# Patient Record
Sex: FEMALE | Race: WHITE
[De-identification: names, ages, dates, MRNs, and addresses within clinical notes are randomized per-mention and may not be internally consistent; named-entity substitution may affect disease eponyms.]

---

## 2017-09-14 ENCOUNTER — HOSPITAL ENCOUNTER (EMERGENCY)
Dept: HOSPITAL 62 - ER | Age: 22
LOS: 1 days | Discharge: HOME | End: 2017-09-15
Payer: SELF-PAY

## 2017-09-14 DIAGNOSIS — H93.8X2: Primary | ICD-10-CM

## 2017-09-14 PROCEDURE — 70250 X-RAY EXAM OF SKULL: CPT

## 2017-09-14 PROCEDURE — 99283 EMERGENCY DEPT VISIT LOW MDM: CPT

## 2017-09-15 VITALS — SYSTOLIC BLOOD PRESSURE: 132 MMHG | DIASTOLIC BLOOD PRESSURE: 60 MMHG

## 2017-09-15 NOTE — ER DOCUMENT REPORT
ED Foreign Body





- General


Chief Complaint: Foreign Body


Stated Complaint: POSSIBLE FOREIGN OBJECT IN EAR


Time Seen by Provider: 09/14/17 23:19


Mode of Arrival: Ambulatory


Information source: Patient


Notes: 





Patient is a 22-year-old female who presents to the ER today for possible 

foreign body stuck in her left earlobe.  Patient states that she lost the 

backing of her left earring in the bed the other night and feels a lump in her 

left earlobe that she has never felt before.  She denies any redness, swelling, 

drainage.  She cannot see the  back of the earring.


TRAVEL OUTSIDE OF THE [place] IN LAST 30 DAYS: No





Past Medical History





- General


Information source: Patient





- Social History


Smoking Status: Unknown if Ever Smoked


Cigarette use (# per day): No


Chew tobacco use (# tins/day): No


Frequency of alcohol use: None


Drug Abuse: None


Family History: Reviewed & Not Pertinent


Patient has suicidal ideation: No


Patient has homicidal ideation: No


Renal/ Medical History: Denies: Hx Peritoneal Dialysis





Review of Systems





- Review of Systems


Constitutional: No symptoms reported


EENT: See HPI


Cardiovascular: No symptoms reported


Respiratory: No symptoms reported


Gastrointestinal: No symptoms reported


Genitourinary: No symptoms reported


Female Genitourinary: No symptoms reported


Musculoskeletal: No symptoms reported


Skin: No symptoms reported


Hematologic/Lymphatic: No symptoms reported


Neurological/Psychological: No symptoms reported





Physical Exam





- Vital signs


Vitals: 


 











Temp Pulse Resp BP Pulse Ox


 


 98.4 F   75   16   133/75 H  99 


 


 09/14/17 20:57  09/14/17 20:57  09/14/17 20:57  09/14/17 20:57  09/14/17 20:57














- Notes


Notes: 





PHYSICAL EXAMINATION: 


GENERAL: Well-appearing and in no acute distress. 


HEAD: Atraumatic, normocephalic. 


EYES: Pupils equal round and reactive to light, extraocular movements intact, 

sclera anicteric, conjunctiva are normal. 


ENT: ear canals without erythema or foreign body, TMs pearly grey with good 

bony landmarks, nares patent, oropharynx clear without exudates. Moist mucous 

membranes.  Both earlobes have equal amount of scarring underneath ear piercing


NECK: Normal range of motion, supple without lymphadenopathy 


LUNGS: CTAB and equal. No wheezes rales or rhonchi. 


HEART: Regular rate and rhythm without murmurs


EXTREMITIES: Normal range of motion, no pitting edema. No cyanosis. 


NEUROLOGICAL: Cranial nerves grossly intact. Normal sensory/motor exams. 


PSYCH: Normal mood, normal affect. 


SKIN: Warm, Dry, normal turgor, no rashes or lesions noted 

















Course





- Re-evaluation


Re-evalutation: 





09/15/17 00:29


I tried to advise patient that the lump she feels in the left earlobe feels 

exactly like the right earlobe and is very likely just scarring from her ear 

piercing.  She is adamant that this has never been there before and that she is 

concerned that they are backing is stuck in her earlobe.  She pushes for an x-

ray to be performed to look for the backing.  X-rays negative for any foreign 

body in her earlobe.





- Vital Signs


Vital signs: 


 











Temp Pulse Resp BP Pulse Ox


 


 98.3 F   76   14   132/60 H  97 


 


 09/15/17 00:36  09/15/17 00:36  09/15/17 00:36  09/15/17 00:36  09/15/17 00:36














Discharge





- Discharge


Clinical Impression: 


Ear lump


Qualifiers:


 Laterality: left Qualified Code(s): H93.8X2 - Other specified disorders of 

left ear





Condition: Stable


Disposition: HOME, SELF-CARE


Additional Instructions: 


Return immediately for any new or worsening symptoms.





Follow up with primary care provider, call tomorrow to make followup 

appointment.

## 2017-09-15 NOTE — RADIOLOGY REPORT (SQ)
EXAM DESCRIPTION:  SKULL 1-3 VIEWS



COMPLETED DATE/TIME:  9/14/2017 11:57 pm



REASON FOR STUDY:  foreign body in earlobe? left



COMPARISON:  None.



NUMBER OF VIEWS:  Three view.



TECHNIQUE:  Images of the facial bones acquired.



LIMITATIONS:  None.



FINDINGS:  ORBITS: No fracture. No foreign body.

SINUSES: No mucosal thickening. No air fluid levels.

FACIAL BONES: No fracture.

OTHER: Left ear lobe appears radiographically unremarkable; no radiopaque foreign body, as queried.



IMPRESSION:  NO FOREIGN BODY OR FRACTURE OF THE FACIAL BONES.



TECHNICAL DOCUMENTATION:  JOB ID:  [id number]

 2011 [employer]- All Rights Reserved



FLUOROSCOPY TIME:  Not applicable.

## 2018-08-03 ENCOUNTER — HOSPITAL ENCOUNTER (EMERGENCY)
Dept: HOSPITAL 62 - ER | Age: 23
LOS: 1 days | Discharge: HOME | End: 2018-08-04
Payer: SELF-PAY

## 2018-08-03 DIAGNOSIS — R59.1: ICD-10-CM

## 2018-08-03 DIAGNOSIS — J03.90: Primary | ICD-10-CM

## 2018-08-03 DIAGNOSIS — Z88.3: ICD-10-CM

## 2018-08-03 PROCEDURE — 87077 CULTURE AEROBIC IDENTIFY: CPT

## 2018-08-03 PROCEDURE — 70491 CT SOFT TISSUE NECK W/DYE: CPT

## 2018-08-03 PROCEDURE — 87880 STREP A ASSAY W/OPTIC: CPT

## 2018-08-03 PROCEDURE — 96374 THER/PROPH/DIAG INJ IV PUSH: CPT

## 2018-08-03 PROCEDURE — 96372 THER/PROPH/DIAG INJ SC/IM: CPT

## 2018-08-03 PROCEDURE — 99284 EMERGENCY DEPT VISIT MOD MDM: CPT

## 2018-08-03 PROCEDURE — 96375 TX/PRO/DX INJ NEW DRUG ADDON: CPT

## 2018-08-03 PROCEDURE — 87070 CULTURE OTHR SPECIMN AEROBIC: CPT

## 2018-08-04 VITALS — SYSTOLIC BLOOD PRESSURE: 104 MMHG | DIASTOLIC BLOOD PRESSURE: 49 MMHG

## 2018-08-04 NOTE — RADIOLOGY REPORT (SQ)
EXAM DESCRIPTION: 



CT NECK CHEST WITH IV CONTRAST



COMPLETED DATE/TME:  08/04/2018 01:04



CLINICAL HISTORY: 



23 years, Female, possible jose-tonsillar abscess



COMPARISON:

None.



TECHNIQUE:

Axial CT images of the neck were obtained after the

administration of IV contrast.   Images stored on PACS.

 

All CT scanners at this facility use dose modulation, iterative

reconstruction, and/or weight based dosing when appropriate to

reduce radiation dose to as low as reasonably achievable (ALARA).





[facility]: Dose Right [facility]: CareDose   [facility]: Dose Right    [facility]:

Teradose 4D    [facility]: Smart Technologies



LIMITATIONS:

None.



FINDINGS:



Glands:

Thyroid: Unremarkable.

Submandibular: Unremarkable.

Parotid: Unremarkable.



Underwood tonsils: The tonsils have a striated appearance. No

evidence of an abscess. There are cervical lymph nodes measuring

up to 1.4 cm in size along the level two



Parapharyngeal fat:  No displacement.



Epiglottis: Unremarkable.



 space: Unremarkable.



Prevertebral space: No edema.



Vascular structures: Unremarkable.



Bones: Unremarkable.

 





IMPRESSION:



Acute tonsillitis with reactive cervical lymphadenopathy. No

evidence of a peritonsillar abscess.

 

TECHNICAL DOCUMENTATION:



Quality ID # [id number]: Final reports with documentation of one or more

dose reduction techniques (e.g., Automated exposure control,

adjustment of the mA and/or kV according to patient size, use of

iterative reconstruction technique)



 2011 [email or web address]- All Rights Reserved

## 2018-08-04 NOTE — ER DOCUMENT REPORT
ED ENT





- General


Chief Complaint: Sore Throat


Stated Complaint: THROAT PAIN


Time Seen by Provider: 08/04/18 00:52


Mode of Arrival: Ambulatory


Notes: 











Patient is an otherwise healthy 23-year-old female who presents with chief 

complaint of sore throat and pain when swallowing.  Patient reports that the 

pain is to the right side of her throat, reports that has been there for 

approximately 4 days.  She reports that she has severe pain with any swallowing 

including liquids.  Patient unsure if she has had a fever.  Patient is speaking 

in complete sentences and is able to swallow her saliva.


TRAVEL OUTSIDE OF THE [place] IN LAST 30 DAYS: No





- Related Data


Allergies/Adverse Reactions: 


 





azithromycin Allergy (Verified 08/04/18 01:29)


 











Past Medical History





- General


Information source: Patient





- Social History


Smoking Status: Never Smoker


Drug Abuse: None


Lives with: Family


Family History: Reviewed & Not Pertinent


Patient has suicidal ideation: No


Patient has homicidal ideation: No





- Medical History


Medical History: Negative


Renal/ Medical History: Denies: Hx Peritoneal Dialysis


Surgical Hx: Negative





- Immunizations


Immunizations up to date: Yes


Hx Diphtheria, Pertussis, Tetanus Vaccination: Yes





Review of Systems





- Review of Systems


Constitutional: See HPI


EENT: See HPI


Cardiovascular: No symptoms reported


Respiratory: No symptoms reported


Gastrointestinal: No symptoms reported


Genitourinary: No symptoms reported


Female Genitourinary: No symptoms reported


Musculoskeletal: No symptoms reported


Skin: No symptoms reported


Hematologic/Lymphatic: No symptoms reported


Neurological/Psychological: No symptoms reported





Physical Exam





- Vital signs


Vitals: 


 











Temp Pulse Resp BP Pulse Ox


 


 99.9 F   83   16   143/74 H  100 


 


 08/04/18 00:01  08/04/18 00:01  08/04/18 00:01  08/04/18 00:01  08/04/18 00:01














- Notes


Notes: 





GENERAL: Alert, interacts well. No acute distress.


HEAD: Normocephalic, atraumatic.


EYES: Pupils equal, round, and reactive to light. Extraocular movements intact.


ENT: Oral mucosa moist, tongue midline.  Erythematous pharynx with tonsillitis, 

right greater than left, no exudates, no shift of the midline, no uvular edema, 

patent airway.


NECK: Full range of motion. Supple. Trachea midline.  Positive bilateral 

cervical adenopathy anteriorly


LUNGS: Clear to auscultation bilaterally, no wheezes, rales, or rhonchi. No 

respiratory distress.


HEART: Regular rate and rhythm. No murmur


ABDOMEN: Soft, non-tender. Non-distended. Bowel sounds present in all 4 

quadrants.


EXTREMITIES: Moves all 4 extremities spontaneously. No edema, normal radial and 

dorsalis pedis pulses bilaterally. No cyanosis.


BACK: no cervical, thoracic, lumbar midline tenderness. No saddle anesthesia, 

normal distal neurovascular exam. 


NEUROLOGICAL: Alert and oriented x3. Normal speech. [cranial nerves II through 

XII grossly intact]. 


PSYCH: Normal affect, normal mood.


SKIN: Warm, dry, normal turgor. No rashes or lesions noted.








Course





- Re-evaluation


Re-evalutation: 


Patient reporting fever at home, she has obvious cervical adenopathy, obvious 

tonsillitis, no cough.  Meets all Centor criteria. 





I reviewed CAT scan of the soft tissues of the neck with contrast which had 

Juarez been performed, this was negative for abscess but did show cervical 

adenopathy and tonsillitis.  Patient was treated with dexamethasone and Toradol 

already, on my reevaluation patient states that her symptoms are significantly 

improved and she is very grateful.  Strep is negative but this was discussed 

with patient.  No splenomegaly or abdominal pain noted on exam.  Patient will 

be covered for strep, discussed possibility of this being viral, given 

penicillin G injection here, discussed expectations, follow-up, and return 

precautions.  Patient states satisfaction and agreement.





- Vital Signs


Vital signs: 


 











Temp Pulse Resp BP Pulse Ox


 


 97.9 F   78   18   104/49 L  99 


 


 08/04/18 03:56  08/04/18 03:56  08/04/18 03:56  08/04/18 03:56  08/04/18 03:56














Discharge





- Discharge


Clinical Impression: 


 Tonsillitis, Lymphadenopathy





Pharyngitis


Qualifiers:


 Pharyngitis/tonsillitis etiology: unspecified etiology Qualified Code(s): 

J02.9 - Acute pharyngitis, unspecified





Condition: Stable


Disposition: HOME, SELF-CARE


Additional Instructions: 


Your CAT scan shows swollen lymph nodes, tonsillar infection, but no abscess or 

other concerning findings.  You have been treated for the swelling already, you 

have also been covered for strep.  Your strep test was negative but you meet 

the criteria for strep throat and therefore you were treated.  It is still 

possible this was a viral cause such as mono.  If you continue to have fever 

and sore throat for several days this is more likely.  Follow-up with primary 

care.  Return if you worsen including difficulty swallowing or breathing, 

swelling of the neck, severe abdominal pain, or any other concerning symptoms.

## 2018-08-04 NOTE — ER DOCUMENT REPORT
ED Medical Screen (RME)





- General


Chief Complaint: Sore Throat


Stated Complaint: THROAT PAIN


Time Seen by Provider: 08/04/18 00:52


Mode of Arrival: Ambulatory


Information source: Patient


Notes: 





Patient is an otherwise healthy 23-year-old female who presents with chief 

complaint of sore throat and pain when swallowing.  Patient reports that the 

pain is to the right side of her throat, reports that has been there for 

approximately 4 days.  She reports that she has severe pain with any swallowing 

including liquids.  Patient unsure if she has had a fever.  Patient is speaking 

in complete sentences and is able to swallow her saliva.





Exam:


Swelling noted to bilateral tonsils, increased swelling to the right tonsil.  

Tonsils are not touching and uvula is midline.





I have greeted and performed a rapid initial assessment of this patient.  A 

comprehensive ED assessment and evaluation of the patient, analysis of test 

results and completion of the medical decision making process will be conducted 

by additional ED providers.  Dictation of this chart was performed using voice 

recognition software; therefore, there may be some unintended grammatical 

errors.


TRAVEL OUTSIDE OF THE [place] IN LAST 30 DAYS: No





- Related Data


Allergies/Adverse Reactions: 


 





azithromycin Allergy (Verified 08/03/18 23:58)


 











Past Medical History


Renal/ Medical History: Denies: Hx Peritoneal Dialysis





Physical Exam





- Vital signs


Vitals: 





 











Temp Pulse Resp BP Pulse Ox


 


 99.9 F   83   16   143/74 H  100 


 


 08/04/18 00:01  08/04/18 00:01  08/04/18 00:01  08/04/18 00:01  08/04/18 00:01














Course





- Vital Signs


Vital signs: 





 











Temp Pulse Resp BP Pulse Ox


 


 99.9 F   83   16   143/74 H  100 


 


 08/04/18 00:01  08/04/18 00:01  08/04/18 00:01  08/04/18 00:01  08/04/18 00:01

## 2018-10-07 ENCOUNTER — HOSPITAL ENCOUNTER (EMERGENCY)
Dept: HOSPITAL 62 - ER | Age: 23
Discharge: HOME | End: 2018-10-07
Payer: SELF-PAY

## 2018-10-07 VITALS — SYSTOLIC BLOOD PRESSURE: 154 MMHG | DIASTOLIC BLOOD PRESSURE: 82 MMHG

## 2018-10-07 DIAGNOSIS — Z88.3: ICD-10-CM

## 2018-10-07 DIAGNOSIS — N94.6: ICD-10-CM

## 2018-10-07 DIAGNOSIS — N73.9: Primary | ICD-10-CM

## 2018-10-07 DIAGNOSIS — F17.210: ICD-10-CM

## 2018-10-07 DIAGNOSIS — N93.8: ICD-10-CM

## 2018-10-07 DIAGNOSIS — R10.2: ICD-10-CM

## 2018-10-07 LAB
ADD MANUAL DIFF: NO
ANION GAP SERPL CALC-SCNC: 14 MMOL/L (ref 5–19)
APPEARANCE UR: (no result)
APTT PPP: YELLOW S
BASOPHILS # BLD AUTO: 0 10^3/UL (ref 0–0.2)
BASOPHILS NFR BLD AUTO: 0.2 % (ref 0–2)
BILIRUB UR QL STRIP: NEGATIVE
BUN SERPL-MCNC: 10 MG/DL (ref 7–20)
CALCIUM: 9.3 MG/DL (ref 8.4–10.2)
CHLAM PCR: NOT DETECTED
CHLORIDE SERPL-SCNC: 104 MMOL/L (ref 98–107)
CO2 SERPL-SCNC: 22 MMOL/L (ref 22–30)
EOSINOPHIL # BLD AUTO: 0.2 10^3/UL (ref 0–0.6)
EOSINOPHIL NFR BLD AUTO: 1.8 % (ref 0–6)
ERYTHROCYTE [DISTWIDTH] IN BLOOD BY AUTOMATED COUNT: 17.2 % (ref 11.5–14)
GLUCOSE SERPL-MCNC: 107 MG/DL (ref 75–110)
GLUCOSE UR STRIP-MCNC: NEGATIVE MG/DL
GON PCR: NOT DETECTED
HCT VFR BLD CALC: 31 % (ref 36–47)
HGB BLD-MCNC: 10.1 G/DL (ref 12–15.5)
KETONES UR STRIP-MCNC: NEGATIVE MG/DL
LYMPHOCYTES # BLD AUTO: 1.4 10^3/UL (ref 0.5–4.7)
LYMPHOCYTES NFR BLD AUTO: 14.3 % (ref 13–45)
MCH RBC QN AUTO: 22.9 PG (ref 27–33.4)
MCHC RBC AUTO-ENTMCNC: 32.7 G/DL (ref 32–36)
MCV RBC AUTO: 70 FL (ref 80–97)
MONOCYTES # BLD AUTO: 0.7 10^3/UL (ref 0.1–1.4)
MONOCYTES NFR BLD AUTO: 7.3 % (ref 3–13)
NEUTROPHILS # BLD AUTO: 7.7 10^3/UL (ref 1.7–8.2)
NEUTS SEG NFR BLD AUTO: 76.4 % (ref 42–78)
NITRITE UR QL STRIP: NEGATIVE
PH UR STRIP: 5 [PH] (ref 5–9)
PLATELET # BLD: 326 10^3/UL (ref 150–450)
POTASSIUM SERPL-SCNC: 4.1 MMOL/L (ref 3.6–5)
PROT UR STRIP-MCNC: NEGATIVE MG/DL
RBC # BLD AUTO: 4.42 10^6/UL (ref 3.72–5.28)
RBCS (WET MOUNT): (no result)
SODIUM SERPL-SCNC: 139.8 MMOL/L (ref 137–145)
SP GR UR STRIP: 1.03
T.VAGINALIS (WET MOUNT): (no result)
TOTAL CELLS COUNTED % (AUTO): 100 %
UROBILINOGEN UR-MCNC: 2 MG/DL (ref ?–2)
WBC # BLD AUTO: 10 10^3/UL (ref 4–10.5)
WBCS (WET MOUNT): (no result)
YEAST (WET MOUNT): (no result)

## 2018-10-07 PROCEDURE — 36415 COLL VENOUS BLD VENIPUNCTURE: CPT

## 2018-10-07 PROCEDURE — 51701 INSERT BLADDER CATHETER: CPT

## 2018-10-07 PROCEDURE — 76830 TRANSVAGINAL US NON-OB: CPT

## 2018-10-07 PROCEDURE — 85025 COMPLETE CBC W/AUTO DIFF WBC: CPT

## 2018-10-07 PROCEDURE — 96372 THER/PROPH/DIAG INJ SC/IM: CPT

## 2018-10-07 PROCEDURE — 81001 URINALYSIS AUTO W/SCOPE: CPT

## 2018-10-07 PROCEDURE — 93976 VASCULAR STUDY: CPT

## 2018-10-07 PROCEDURE — 87491 CHLMYD TRACH DNA AMP PROBE: CPT

## 2018-10-07 PROCEDURE — 84703 CHORIONIC GONADOTROPIN ASSAY: CPT

## 2018-10-07 PROCEDURE — 87210 SMEAR WET MOUNT SALINE/INK: CPT

## 2018-10-07 PROCEDURE — 99284 EMERGENCY DEPT VISIT MOD MDM: CPT

## 2018-10-07 PROCEDURE — 80048 BASIC METABOLIC PNL TOTAL CA: CPT

## 2018-10-07 PROCEDURE — 87591 N.GONORRHOEAE DNA AMP PROB: CPT

## 2018-10-07 NOTE — RADIOLOGY REPORT (SQ)
EXAM DESCRIPTION:  U/S NON OB PEL TV W/DOPPLER



COMPLETED DATE/TIME:  10/7/2018 11:40 am



REASON FOR STUDY:  LLQ pain



COMPARISON:  None.



TECHNIQUE:  Dynamic and static grayscale images acquired of the pelvis via transvaginal approach and 
recorded on PACS. Additional selected color Doppler and spectral images recorded.



LIMITATIONS:  None.



FINDINGS:  UTERUS: Contour normal.  No mass.

ENDOMETRIAL STRIPE: No focal or generalized thickening. No masses.

CERVIX: Few nabothian cysts.

RIGHT OVARY AND DOPPLER: Not visualized.

LEFT OVARY AND DOPPLER: Normal size. No worrisome masses. Normal arterial vascular flow without evide
nce for torsion.

FREE FLUID: Trace free fluid, physiologic in a menstruating female

OTHER: No other significant finding.

MEASUREMENTS:

UTERUS: 9.4 x 5.3 x 5.7 cm

ENDOMETRIAL STRIPE: 8 mm

RIGHT OVARY: Not visualized

LEFT OVARY: 2.8 x 4.1 x 2.7 cm



IMPRESSION:

1. Nonvisualization right ovary.

2. Otherwise, normal pelvic ultrasound.



TECHNICAL DOCUMENTATION:  JOB ID:  [id number]

 [employer]- All Rights Reserved                          Rev-[id number]



Reading location - IP/workstation name: [email or web address]

## 2018-10-07 NOTE — ER DOCUMENT REPORT
ED GI/





- General


Chief Complaint: Pelvic Pain


Stated Complaint: PELVIC PAIN


Time Seen by Provider: 10/07/18 10:40


Mode of Arrival: Ambulatory


Information source: Patient


Notes: 





Patient presents complaining of left lower pelvic pain for the past week with 

vaginal bleeding for the past 3 days.  Patient also complains of left lower 

back pain.  Patient denies any urinary symptoms.  Patient does report irregular 

menstrual bleeding and a history of PCO S.  Patient denies any problems with 

bowel movements and states last bowel movement was yesterday.


TRAVEL OUTSIDE OF THE [place] IN LAST 30 DAYS: No





- HPI


Patient complains to provider of: Pelvic pain, Vaginal bleeding.  No: Vaginal 

discharge, Vomiting


Onset: Other - Vaginal bleeding times 3 days


Timing/Duration: Persistent


Quality of pain: Cramping


Pain Level: 5


Location: Pelvis


Vaginal bleeding (Compared to normal period): Similar


Sexual history: Active


Associated symptoms: denies: Diarrhea, Dizzy, Nausea, Urinary hesitancy, 

Urinary frequency, Urinary retention, Vomiting


Exacerbated by: Denies


Relieved by: Denies





- Related Data


Allergies/Adverse Reactions: 


 





azithromycin Allergy (Verified 08/04/18 01:29)


 











Past Medical History





- General


Information source: Patient





- Social History


Smoking Status: Current Every Day Smoker


Smoking Education Provided: Yes


Frequency of alcohol use: None


Drug Abuse: None


Occupation: Paraprofessional


Lives with: Spouse/Significant other


Family History: Reviewed & Not Pertinent


Patient has suicidal ideation: No


Patient has homicidal ideation: No


Renal/ Medical History: Reports: Hx Ovarian Cysts.  Denies: Hx Peritoneal 

Dialysis


Surgical Hx: Negative





- Immunizations


Immunizations up to date: Yes


Hx Diphtheria, Pertussis, Tetanus Vaccination: Yes





Review of Systems





- Review of Systems


Constitutional: No symptoms reported.  denies: Fever


EENT: No symptoms reported


Cardiovascular: No symptoms reported.  denies: Chest pain


Respiratory: No symptoms reported.  denies: Cough, Short of breath


Gastrointestinal: Abdominal pain.  denies: Vomiting


Genitourinary: No symptoms reported.  denies: Dysuria, Flank pain


Female Genitourinary: Irregular period, Vaginal bleeding


Musculoskeletal: Back pain


Skin: No symptoms reported


Hematologic/Lymphatic: No symptoms reported


Neurological/Psychological: No symptoms reported





Physical Exam





- Vital signs


Vitals: 


 











Temp Pulse Resp BP Pulse Ox


 


 98.9 F   96   14   149/76 H  99 


 


 10/07/18 10:32  10/07/18 10:32  10/07/18 10:32  10/07/18 10:32  10/07/18 10:32














- General


General appearance: Appears well, Alert


In distress: None





- HEENT


Head: Normocephalic, Atraumatic


Eyes: Normal


Conjunctiva: Normal


Nasal: Normal


Mouth/Lips: Normal


Mucous membranes: Normal


Neck: Normal, Supple





- Respiratory


Respiratory status: No respiratory distress


Chest status: Nontender


Breath sounds: Normal.  No: Rales, Rhonchi, Stridor, Wheezing


Chest palpation: Normal





- Cardiovascular


Rhythm: Regular


Heart sounds: S1 appreciated, S2 appreciated


Murmur: No





- Abdominal


Inspection: Morbidly Obese


Distension: No distension


Bowel sounds: Normal


Tenderness: Tender - LLQ


Organomegaly: No organomegaly





- Genitourinary


External exam: Normal


Speculum exam: Cervix closed


Vaginal bleeding: Mild


Bimanuel exam: Cervical motion tender.  No: Adnexal mass





- Back


Back: Normal, Tender - Left lower lumbar paraspinal tenderness.  No: CVA 

tenderness





- Extremities


General upper extremity: Normal inspection, Normal ROM


General lower extremity: Normal inspection, Normal ROM





- Neurological


Neuro grossly intact: Yes


Cognition: Normal


[place] Coma Scale Eye Opening: Spontaneous


[place] Coma Scale Verbal: Oriented


Rima Coma Scale Motor: Obeys Commands


Rima Coma Scale Total: 15





- Psychological


Associated symptoms: Normal affect, Normal mood





- Skin


Skin Temperature: Warm


Skin Moisture: Dry


Skin Color: Pale





Course





- Re-evaluation


Re-evalutation: 





10/07/18 


Patient's abdomen soft, no guarding.  Patient presents with abdominal pain 

without signs of peritonitis or other life-threatening or serious etiology.  

Patient appears stable for discharge and has been instructed to return 

immediately if the symptoms worsen in any way, or in 8-12 hours if not improved 

for reevaluation.  The patient has been instructed to return if the symptoms 

worsen or change in any way.








- Vital Signs


Vital signs: 


 











Temp Pulse Resp BP Pulse Ox


 


 98.9 F   81   16   154/82 H  100 


 


 10/07/18 12:46  10/07/18 12:46  10/07/18 12:46  10/07/18 12:46  10/07/18 12:46














- Laboratory


Result Diagrams: 


 10/07/18 11:07





 10/07/18 11:07


Laboratory results interpreted by me: 


 











  10/07/18 10/07/18





  11:07 11:15


 


Hgb  10.1 L 


 


Hct  31.0 L 


 


MCV  70 L 


 


MCH  22.9 L 


 


RDW  17.2 H 


 


Urine Urobilinogen   2.0 H














 Labs- Entire Visit











  10/07/18 10/07/18 10/07/18





  11:07 11:07 11:07


 


WBC  10.0  


 


RBC  4.42  


 


Hgb  10.1 L  


 


Hct  31.0 L  


 


MCV  70 L  


 


MCH  22.9 L  


 


MCHC  32.7  


 


RDW  17.2 H  


 


Plt Count  326  


 


Seg Neutrophils %  76.4  


 


Lymphocytes %  14.3  


 


Monocytes %  7.3  


 


Eosinophils %  1.8  


 


Basophils %  0.2  


 


Absolute Neutrophils  7.7  


 


Absolute Lymphocytes  1.4  


 


Absolute Monocytes  0.7  


 


Absolute Eosinophils  0.2  


 


Absolute Basophils  0.0  


 


Sodium   139.8 


 


Potassium   4.1 


 


Chloride   104 


 


Carbon Dioxide   22 


 


Anion Gap   14 


 


BUN   10 


 


Creatinine   0.60 


 


Est GFR ( Amer)   > 60 


 


Est GFR (Non-Af Amer)   > 60 


 


Glucose   107 


 


Calcium   9.3 


 


Serum HCG, Qual    NEGATIVE


 


Urine Color   


 


Urine Appearance   


 


Urine pH   


 


Ur Specific Gravity   


 


Urine Protein   


 


Urine Glucose (UA)   


 


Urine Ketones   


 


Urine Blood   


 


Urine Nitrite   


 


Urine Bilirubin   


 


Urine Urobilinogen   


 


Ur Leukocyte Esterase   


 


Urine WBC (Auto)   


 


Urine RBC (Auto)   


 


Squamous Epi Cells Auto   


 


Urine Mucus (Auto)   


 


Urine Ascorbic Acid   


 


Trichomonas (Wet Prep)   


 


Vaginal WBC   


 


Vaginal RBC   


 


Vaginal Yeast   














  10/07/18 10/07/18





  11:15 11:37


 


WBC  


 


RBC  


 


Hgb  


 


Hct  


 


MCV  


 


MCH  


 


MCHC  


 


RDW  


 


Plt Count  


 


Seg Neutrophils %  


 


Lymphocytes %  


 


Monocytes %  


 


Eosinophils %  


 


Basophils %  


 


Absolute Neutrophils  


 


Absolute Lymphocytes  


 


Absolute Monocytes  


 


Absolute Eosinophils  


 


Absolute Basophils  


 


Sodium  


 


Potassium  


 


Chloride  


 


Carbon Dioxide  


 


Anion Gap  


 


BUN  


 


Creatinine  


 


Est GFR ( Amer)  


 


Est GFR (Non-Af Amer)  


 


Glucose  


 


Calcium  


 


Serum HCG, Qual  


 


Urine Color  YELLOW 


 


Urine Appearance  SLIGHTLY-CLOUDY 


 


Urine pH  5.0 


 


Ur Specific Gravity  1.027 


 


Urine Protein  NEGATIVE 


 


Urine Glucose (UA)  NEGATIVE 


 


Urine Ketones  NEGATIVE 


 


Urine Blood  NEGATIVE 


 


Urine Nitrite  NEGATIVE 


 


Urine Bilirubin  NEGATIVE 


 


Urine Urobilinogen  2.0 H 


 


Ur Leukocyte Esterase  NEGATIVE 


 


Urine WBC (Auto)  1 


 


Urine RBC (Auto)  3 


 


Squamous Epi Cells Auto  3 


 


Urine Mucus (Auto)  MANY 


 


Urine Ascorbic Acid  NEGATIVE 


 


Trichomonas (Wet Prep)   NO TRICHOMONAS SEEN


 


Vaginal WBC   FEW WBCS SEEN


 


Vaginal RBC   2+ RBCS SEEN


 


Vaginal Yeast   NO YEAST SEEN














- Diagnostic Test


Radiology reviewed: Reports reviewed





Discharge





- Discharge


Clinical Impression: 


 Dysmenorrhea, PID (acute pelvic inflammatory disease)





Condition: Stable


Disposition: HOME, SELF-CARE


Instructions:  Anti-Inflammatory Medication ([facility]), Doxycycline ([facility]), 

Dysmenorrhea ([facility]), Ob-Gyn Doctors, Pelvic Inflammatory Disease ([facility]), Pelvic 

Pain ([facility])


Additional Instructions: 


Return immediately for any new or worsening symptoms





Followup with your primary care provider, call tomorrow to make a followup 

appointment





Follow-up with OB/GYN provider for recheck, call tomorrow for an appointment


Prescriptions: 


Naproxen [Naprosyn 250 Nmg Tablet] 1 tab PO BID #14 tablet


Forms:  Return to Work


Referrals: 


[facility] [Provider Group] - Follow up in 3-5 days

## 2018-11-21 ENCOUNTER — HOSPITAL ENCOUNTER (EMERGENCY)
Dept: HOSPITAL 62 - ER | Age: 23
Discharge: HOME | End: 2018-11-21
Payer: SELF-PAY

## 2018-11-21 VITALS — SYSTOLIC BLOOD PRESSURE: 142 MMHG | DIASTOLIC BLOOD PRESSURE: 76 MMHG

## 2018-11-21 DIAGNOSIS — R19.7: ICD-10-CM

## 2018-11-21 DIAGNOSIS — F17.200: ICD-10-CM

## 2018-11-21 DIAGNOSIS — R11.2: Primary | ICD-10-CM

## 2018-11-21 DIAGNOSIS — Z88.1: ICD-10-CM

## 2018-11-21 PROCEDURE — S0119 ONDANSETRON 4 MG: HCPCS

## 2018-11-21 PROCEDURE — 99283 EMERGENCY DEPT VISIT LOW MDM: CPT

## 2018-11-21 NOTE — ER DOCUMENT REPORT
ED General





- General


Chief Complaint: Nausea/Vomiting/Diarrhea


Stated Complaint: FLU SYMPTOMS


Time Seen by Provider: 11/21/18 10:42


TRAVEL OUTSIDE OF THE [place] IN LAST 30 DAYS: No





- HPI


Notes: 





Patient is a 23-year-old female that presents to the emergency department for 

chief complaint of nausea vomiting and diarrhea.


Patient reports her symptoms started yesterday.  Yesterday she had 3 episodes 

of diarrhea and 2-3 episodes of emesis.  Today she reports her vomiting has 

stopped but she still feels nauseated.  She had one episode of diarrhea.  She 

denies any abdominal pain.  She does report a fever yesterday of 100.1 which 

resolved with Advil.  Her last dose of Advil was at 10:30 PM yesterday.  She 

denies any sick contacts.  She denies any shortness of breath, chest pain, 

congestion, numbness and weakness.





Past Medical History: Negative





Past Surgical History: Negative





Social History: Occasional tobacco.  Denies drugs and alcohol





Family History: Reviewed and noncontributory for presenting illness





Allergies: Reviewed, see documented allergy list.








REVIEW OF SYSTEMS:





CONSTITUTIONAL : 





No fever





No chills





No diaphoresis





No recent illness





EENT:





No vision changes





No congestion





No sore throat  





CARDIOVASCULAR:





No chest pain





No palpitations





RESPIRATORY:





No shortness of breath





No cough





No difficulty breathing





GASTROINTESTINAL: 





No abdominal pain





nausea





 vomiting





diarrhea





GENITOURINARY:





No dysuria





No hematuria





No difficulty urinating





MUSCULOSKELETAL:





No back pain





No leg pain





No arm pain





SKIN:  





No rashes





No lesions





LYMPHATIC: 





No swollen, enlarged glands.





NEUROLOGICAL: 





No lightheadedness





No headache





No weakness





No paresthesias





PSYCHIATRIC:





No anxiety





No depression 








PHYSICAL EXAMINATION:





Vital signs reviewed, nursing noted reviewed.





GENERAL: Well-appearing, well-nourished and in no acute distress.





HEAD: Atraumatic, normocephalic.





EYES: Eyes appear normal, extraocular movements intact, sclera anicteric, 

conjunctiva are normal.





ENT: nares patent, oropharynx clear without exudates.  Moist mucous membranes.





NECK: Normal range of motion, supple without lymphadenopathy





LUNGS: Breath sounds clear to auscultation bilaterally and equal.  No wheezes 

rales or rhonchi.





HEART: Regular rate and rhythm without murmurs





ABDOMEN: Soft, nontender, normoactive bowel sounds.  No rebound, guarding, or 

rigidity. No masses appreciated.





EXTREMITIES: Nontender, good range of motion, no pitting or edema. 





NEUROLOGICAL: No focal neurological deficits. Moves all extremities 

spontaneously Motor and sensory grossly intact on exam.





PSYCH: Normal mood, normal affect.





SKIN: Warm, Dry, normal turgor, no rashes or lesions noted on exposed skin











- Related Data


Allergies/Adverse Reactions: 


 





azithromycin Allergy (Verified 08/04/18 01:29)


 











Past Medical History





- Social History


Smoking Status: Current Some Day Smoker


Chew tobacco use (# tins/day): No


Frequency of alcohol use: Occasional


Drug Abuse: None


Family History: Reviewed & Not Pertinent


Patient has suicidal ideation: No


Patient has homicidal ideation: No


Renal/ Medical History: Reports: Hx Ovarian Cysts.  Denies: Hx Peritoneal 

Dialysis





- Immunizations


Immunizations up to date: Yes


Hx Diphtheria, Pertussis, Tetanus Vaccination: Yes





Review of Systems





- Review of Systems


Notes: 





Dictated





Physical Exam





- Vital signs


Vitals: 





 











Temp Pulse Resp BP Pulse Ox


 


 98.6 F   89   16   142/76 H  99 


 


 11/21/18 10:24  11/21/18 10:24  11/21/18 10:24  11/21/18 10:24  11/21/18 10:24














- Notes


Notes: 


dictated











Course





- Re-evaluation


Re-evalutation: 





11/21/18 10:53


Vitals reviewed.  Nursing notes reviewed.  Patient appears well-hydrated and 

nontoxic.  She is currently afebrile.  She will be given Zofran and ibuprofen 

for symptom medic management.  She will be given a prescription for Zofran at 

home.  She was counseled on increasing oral hydration and return precautions.  

Discharged home in stable condition.





- Vital Signs


Vital signs: 





 











Temp Pulse Resp BP Pulse Ox


 


 98.6 F   89   16   142/76 H  99 


 


 11/21/18 10:24  11/21/18 10:24  11/21/18 10:24  11/21/18 10:24  11/21/18 10:24














Discharge





- Discharge


Clinical Impression: 


 Vomiting and diarrhea





Condition: Stable


Disposition: HOME, SELF-CARE


Instructions:  Vomiting ([facility]), Diarrhea, Nonspecific ([facility]), Family Physicians / 

Practices


Prescriptions: 


Ondansetron [Zofran Odt 4 mg Tablet] 1 tab PO Q4H PRN #15 tab.rapdis


 PRN Reason: For Nausea/Vomiting


Forms:  Return to Work


Referrals: 


[facility] [Provider Group] - Follow up as needed

## 2019-01-23 ENCOUNTER — HOSPITAL ENCOUNTER (EMERGENCY)
Dept: HOSPITAL 62 - ER | Age: 24
Discharge: HOME | End: 2019-01-23
Payer: SELF-PAY

## 2019-01-23 VITALS — DIASTOLIC BLOOD PRESSURE: 61 MMHG | SYSTOLIC BLOOD PRESSURE: 129 MMHG

## 2019-01-23 DIAGNOSIS — M79.10: ICD-10-CM

## 2019-01-23 DIAGNOSIS — R53.1: ICD-10-CM

## 2019-01-23 DIAGNOSIS — R11.10: ICD-10-CM

## 2019-01-23 DIAGNOSIS — J11.1: Primary | ICD-10-CM

## 2019-01-23 DIAGNOSIS — R50.9: ICD-10-CM

## 2019-01-23 LAB
A TYPE INFLUENZA AG: POSITIVE
B INFLUENZA AG: NEGATIVE

## 2019-01-23 PROCEDURE — 99283 EMERGENCY DEPT VISIT LOW MDM: CPT

## 2019-01-23 PROCEDURE — 87804 INFLUENZA ASSAY W/OPTIC: CPT

## 2019-01-23 NOTE — ER DOCUMENT REPORT
HPI





- HPI


Patient complains to provider of: bodyaches


Time Seen by Provider: 01/23/19 05:27


Pain Level: 3


Context: 





Patient is a 23-year-old female presents to the emergency department complaining

of generalized body aches, cough, congestion, sneezing, fever since Monday.


Patient states she has taken at home Tylenol and Motrin for her generalized body

aches but presents to the emergency department for further evaluation.  States 

she vomited twice today which concerned her.  Patient has not vomited in the 

emergency department and has been able to p.o. fluids with no vomiting.  Patient

states she is currently on her menstrual cycle.





Past medical history: None


Medications: None


Allergies: None











- CONSTITUTIONAL


Constitutional: REPORTS: Fever





- EENT


EENT: REPORTS: Sore Throat.  DENIES: Ear Pain





- NEURO


Neurology: REPORTS: Headache, Weakness





- CARDIOVASCULAR


Cardiovascular: REPORTS: Chest pain





- RESPIRATORY


Respiratory: REPORTS: Trouble Breathing, Coughing





- REPRODUCTIVE


Reproductive: DENIES: Pregnant:





Past Medical History





- General


Information source: Patient





- Social History


Smoking Status: Former Smoker


Frequency of alcohol use: Occasional


Drug Abuse: None


Family History: Reviewed & Not Pertinent


Patient has suicidal ideation: No


Patient has homicidal ideation: No


Renal/ Medical History: Reports: Hx Ovarian Cysts.  Denies: Hx Peritoneal 

Dialysis





- Immunizations


Immunizations up to date: Yes


Hx Diphtheria, Pertussis, Tetanus Vaccination: Yes





Vertical Provider Document





- CONSTITUTIONAL


Agree With Documented VS: Yes


Notes: 





GENERAL: Alert, interacts well. No acute distress.


HEAD: Normocephalic, atraumatic.  No frontal or maxillary sinus tenderness noted


EYES: Pupils equal, round, and reactive to light. Extraocular movements intact.


ENT: Oral mucosa moist, tongue midline. Nares patent, TM's intact, 

nonerythematous, nonbulging bilaterally.  Pharynx within normal limits no 

palatal petechiae or exudate noted tonsils +2 bilaterally


NECK: Full range of motion. Supple. Trachea midline.  No lymphadenopathy 

appreciated


LUNGS: Clear to auscultation bilaterally, no wheezes, rales, or rhonchi. No 

respiratory distress.


HEART: Regular rate and rhythm. No murmur


ABDOMEN: Obese soft, non-tender. Non-distended. Bowel sounds present in all 4 

quadrants.


EXTREMITIES: Moves all 4 extremities spontaneously. No edema, normal radial and 

dorsalis pedis pulses bilaterally. No cyanosis.


BACK: no cervical, thoracic, lumbar midline tenderness. No saddle anesthesia, 

normal distal neurovascular exam. 


NEUROLOGICAL: Alert and oriented x3. Normal speech. cranial nerves II through 

XII grossly intact 


PSYCH: Normal affect, normal mood.


SKIN: Warm, dry, normal turgor. No rashes or lesions noted.








- INFECTION CONTROL


TRAVEL OUTSIDE OF THE [place] IN LAST 30 DAYS: No





Course





- Re-evaluation


Re-evalutation: 





01/23/19 06:21


Patient's influenza testing did come back positive.  Discussed this diagnosis 

with her at bedside.  Discussed symptomatic treatments, staying well-hydrated 

and use of antibiotics.  Patient is no longer tachycardic or febrile in the 

emergency department, stable for discharge.





- Vital Signs


Vital signs: 


                                        











Temp Pulse Resp BP Pulse Ox


 


 99.5 F   99   18   129/61 H  100 


 


 01/23/19 05:06  01/23/19 05:06  01/23/19 05:06  01/23/19 05:06  01/23/19 05:06














Discharge





- Discharge


Clinical Impression: 


 Influenza





Condition: Stable


Disposition: HOME, SELF-CARE


Instructions:  Influenza ([facility]) [id number]


Additional Instructions: 


As we discussed you have been seen and treated in the emergency department for 

your generalized body aches, fever, cough, congestion, vomiting.  Your influenza

testing is positive.  Please make sure you take over-the-counter Tylenol and 

Motrin for your generalized body aches and fever.  Please make sure you stay 

well-hydrated.  Please make sure you take prescription medications as 

prescribed.  Please follow-up with your primary care provider in the next 24-48 

hours.  Return to the emergency room for any other concerning symptoms


Prescriptions: 


Benzonatate [Tessalon Perles 100 mg Capsule] 100 mg PO Q8HP PRN #40 capsule


 PRN Reason: 


Mometasone Furoate [Nasonex] 1 spray NS Q12 #1 spray.pump


Ondansetron HCl [Zofran] 8 mg PO Q6 #12 tablet


Forms:  Return to Work


Referrals: 


[facility] [COMMUNITY BASED STAFF] - Follow up as needed

## 2019-02-07 ENCOUNTER — HOSPITAL ENCOUNTER (EMERGENCY)
Dept: HOSPITAL 62 - ER | Age: 24
Discharge: HOME | End: 2019-02-07
Payer: SELF-PAY

## 2019-02-07 VITALS — SYSTOLIC BLOOD PRESSURE: 120 MMHG | DIASTOLIC BLOOD PRESSURE: 73 MMHG

## 2019-02-07 DIAGNOSIS — J01.90: Primary | ICD-10-CM

## 2019-02-07 DIAGNOSIS — R68.89: ICD-10-CM

## 2019-02-07 DIAGNOSIS — R05: ICD-10-CM

## 2019-02-07 PROCEDURE — 99283 EMERGENCY DEPT VISIT LOW MDM: CPT

## 2019-02-07 PROCEDURE — 71046 X-RAY EXAM CHEST 2 VIEWS: CPT

## 2019-02-07 NOTE — ER DOCUMENT REPORT
HPI





- HPI


Patient complains to provider of: Cough, congestion


Time Seen by Provider: 02/07/19 19:04


Onset/Duration: Persistent


Quality of pain: Achy


Pain Level: 2


Context: 





Patient states she was treated for influenza 2 weeks ago.  Patient's had 

persistent cough since then.  Patient is also had persistent sinus congestion 

and facial pressure.  Patient without any fever.


Associated Symptoms: Nonproductive cough, Rhinnorhea, Sinus pain/drainage.  

denies: Fever


Exacerbated by: Denies


Relieved by: Denies


Similar symptoms previously: Yes


Recently seen / treated by doctor: Yes





- ROS


ROS below otherwise negative: Yes


Systems Reviewed and Negative: Yes All other systems reviewed and negative





- CONSTITUTIONAL


Constitutional: DENIES: Fever





- EENT


EENT: REPORTS: Nasal Drainage-Purulent, Congestion





- RESPIRATORY


Respiratory: REPORTS: Coughing.  DENIES: Trouble Breathing





- GASTROINTESTINAL


Gastrointestinal: DENIES: Patient vomiting





- REPRODUCTIVE


Reproductive: DENIES: Pregnant:





- MUSCULOSKELETAL


Musculoskeletal: DENIES: Back Pain, Neck Pain





- DERM


Skin Color: Normal


Skin Problems: None





Past Medical History





- General


Information source: Patient





- Social History


Smoking Status: Never Smoker


Frequency of alcohol use: Rare


Drug Abuse: None


Occupation: 


Family History: Reviewed & Not Pertinent





- Medical History


Medical History: Negative


Renal/ Medical History: Reports: Hx Ovarian Cysts.  Denies: Hx Peritoneal 

Dialysis


Surgical Hx: Negative





- Immunizations


Immunizations up to date: Yes


Hx Diphtheria, Pertussis, Tetanus Vaccination: Yes





Vertical Provider Document





- CONSTITUTIONAL


Agree With Documented VS: Yes


Exam Limitations: No Limitations


General Appearance: WD/WN, No Apparent Distress





- INFECTION CONTROL


TRAVEL OUTSIDE OF THE [place] IN LAST 30 DAYS: No





- HEENT


HEENT: Atraumatic, Normocephalic.  negative: Pharyngeal Exudate, Pharyngeal 

Tenderness, Pharyngeal Erythema, Tympanic Membrane Red, Tympanic Membrane 

Bulging


Notes: 





Swollen nasal mucosa with clear rhinorrhea





- NECK


Neck: Normal Inspection, Supple.  negative: Lymphadenopathy-Left, 

Lymphadenopathy-Right





- RESPIRATORY


Respiratory: No Respiratory Distress, Chest Non-Tender, Other - Dry cough





- CARDIOVASCULAR


Cardiovascular: Regular Rate, Regular Rhythm, No Murmur





- BACK


Back: Normal Inspection





- MUSCULOSKELETAL/EXTREMETIES


Musculoskeletal/Extremeties: MAEW, FROM





- NEURO


Level of Consciousness: Awake, Alert, Appropriate


Motor/Sensory: No Motor Deficit





- DERM


Integumentary: Warm, Dry, No Rash





Course





- Vital Signs


Vital signs: 


                                        











Temp Pulse Resp BP Pulse Ox


 


 98.9 F   90   20   120/73   98 


 


 02/07/19 18:40  02/07/19 18:40  02/07/19 18:40  02/07/19 18:40  02/07/19 18:40














- Diagnostic Test


Radiology reviewed: Image reviewed, Reports reviewed





Discharge





- Discharge


Clinical Impression: 


 Cough





Sinusitis


Qualifiers:


 Sinusitis location: unspecified location Chronicity: acute Recurrence: non-

recurrent Qualified Code(s): J01.90 - Acute sinusitis, unspecified





Condition: Stable


Disposition: HOME, SELF-CARE


Instructions:  Amoxicillin ([facility]), Sinusitis ([facility])


Additional Instructions: 


Return immediately for any new or worsening symptoms





Followup with your primary care provider, call tomorrow to make a followup 

appointment





Use saline nasal spray to help with congestion


Prescriptions: 


Amoxicillin 500 mg PO TID #30 tablet


Fluticasone Propionate [Flonase Nasal Spray 50 Mcg/Spray 16 gm] 2 spray NASL 

DAILY #1 bottle


Guaifenesin/Pseudoephedrne HCl [Mucinex D ER Tablet] 1 each PO Q12 PRN #12 

tab.er.12h


 PRN Reason: 


Forms:  Return to Work


Referrals: 


[facility] [Provider Group] - Follow up as needed

## 2019-02-07 NOTE — RADIOLOGY REPORT (SQ)
EXAM DESCRIPTION:  CHEST 2 VIEWS



COMPLETED DATE/TIME:  2/7/2019 7:20 pm



REASON FOR STUDY:  cough



COMPARISON:  None.



EXAM PARAMETERS:  NUMBER OF VIEWS: two views

TECHNIQUE: Digital Frontal and Lateral radiographic views of the chest acquired.

RADIATION DOSE: NA

LIMITATIONS: none



FINDINGS:  LUNGS AND PLEURA: No opacities, masses or pneumothorax. No pleural effusion.

MEDIASTINUM AND HILAR STRUCTURES: No masses or contour abnormalities.

HEART AND VASCULAR STRUCTURES: Heart normal size.  No evidence for failure.

BONES: No acute findings.

HARDWARE: None in the chest.

OTHER: No other significant finding.



IMPRESSION:  NO ACUTE RADIOGRAPHIC FINDING IN THE CHEST.



TECHNICAL DOCUMENTATION:  JOB ID:  [id number]

 2011 [employer]- All Rights Reserved



Reading location - IP/workstation name: [email or web address]

## 2019-06-19 ENCOUNTER — HOSPITAL ENCOUNTER (EMERGENCY)
Dept: HOSPITAL 62 - ER | Age: 24
Discharge: HOME | End: 2019-06-19
Payer: SELF-PAY

## 2019-06-19 VITALS — DIASTOLIC BLOOD PRESSURE: 67 MMHG | SYSTOLIC BLOOD PRESSURE: 139 MMHG

## 2019-06-19 DIAGNOSIS — K08.89: Primary | ICD-10-CM

## 2019-06-19 DIAGNOSIS — H92.01: ICD-10-CM

## 2019-06-19 PROCEDURE — 99282 EMERGENCY DEPT VISIT SF MDM: CPT

## 2019-06-19 NOTE — ER DOCUMENT REPORT
HPI





- HPI


Patient complains to provider of: Dental pain


Time Seen by Provider: 06/19/19 10:35


Onset/Duration: Persistent


Quality of pain: Achy


Pain Level: 4


Context: 





Patient complains of dental pain for the past 6 weeks with right ear pain for 

the past 3 days.  Patient denies any fever or facial swelling.


Associated Symptoms: Earache, Other - Dental pain.  denies: Nonproductive cough


Exacerbated by: Denies


Relieved by: Denies


Similar symptoms previously: No


Recently seen / treated by doctor: No





- ROS


ROS below otherwise negative: Yes


Systems Reviewed and Negative: Yes All other systems reviewed and negative





- EENT


EENT: REPORTS: Ear Pain


Notes: 





Dental pain





- RESPIRATORY


Respiratory: DENIES: Coughing





- GASTROINTESTINAL


Gastrointestinal: DENIES: Nausea, Patient vomiting





- REPRODUCTIVE


Reproductive: DENIES: Pregnant:





- DERM


Skin Color: Normal


Skin Problems: None





Past Medical History





- General


Information source: Patient





- Social History


Smoking Status: Never Smoker


Frequency of alcohol use: None


Drug Abuse: None


Occupation: Retail


Family History: Reviewed & Not Pertinent





- Medical History


Medical History: Negative


Renal/ Medical History: Reports: Hx Ovarian Cysts.  Denies: Hx Peritoneal 

Dialysis


Surgical Hx: Negative





- Immunizations


Immunizations up to date: Yes


Hx Diphtheria, Pertussis, Tetanus Vaccination: Yes





Vertical Provider Document





- CONSTITUTIONAL


Agree With Documented VS: Yes


Exam Limitations: No Limitations


General Appearance: WD/WN, No Apparent Distress





- INFECTION CONTROL


TRAVEL OUTSIDE OF THE [place] IN LAST 30 DAYS: No





- HEENT


HEENT: Atraumatic, Normocephalic.  negative: Pharyngeal Exudate, Pharyngeal 

Tenderness, Pharyngeal Erythema, Tympanic Membrane Red, Tympanic Membrane 

Bulging


Mouth Diagram: 


                            __________________________














                            __________________________





 1 - Tenderness, no gingival abscess or trismus, no sublingual or submental 

swelling








- NECK


Neck: Normal Inspection, Supple.  negative: Lymphadenopathy-Left, 

Lymphadenopathy-Right





- RESPIRATORY


Respiratory: Breath Sounds Normal, No Respiratory Distress





- CARDIOVASCULAR


Cardiovascular: Regular Rate, Regular Rhythm





- BACK


Back: Normal Inspection





- MUSCULOSKELETAL/EXTREMETIES


Musculoskeletal/Extremeties: MAEW





- NEURO


Level of Consciousness: Awake, Alert, Appropriate


Motor/Sensory: No Motor Deficit





- DERM


Integumentary: Warm, Dry, No Rash





Course





- Vital Signs


Vital signs: 


                                        











Temp Pulse Resp BP Pulse Ox


 


 98.6 F   85   18   139/67 H  98 


 


 06/19/19 09:42  06/19/19 09:42  06/19/19 09:42  06/19/19 09:42  06/19/19 09:42














Discharge





- Discharge


Clinical Impression: 


 Toothache





Condition: Stable


Disposition: HOME, SELF-CARE


Instructions:  Penicillin V K ([facility]), Toothache ([facility])


Additional Instructions: 


Return immediately for any new or worsening symptoms





Followup with your primary care provider, call tomorrow to make a followup 

appointment





Follow up with a dental care provider


Prescriptions: 


Naproxen [Naprosyn 250 Nmg Tablet] 1 tab PO BID #14 tablet


Penicillin V Potassium [Penicillin Vk 500 mg Tablet] 500 mg PO BID #20 tablet


Forms:  Return to Work


Referrals: 


[facility] [Provider Group] - Follow up as needed


[facility] [Provider Group] - Follow up as needed

## 2019-11-06 ENCOUNTER — HOSPITAL ENCOUNTER (EMERGENCY)
Dept: HOSPITAL 62 - ER | Age: 24
LOS: 1 days | Discharge: HOME | End: 2019-11-07
Payer: SELF-PAY

## 2019-11-06 DIAGNOSIS — R68.84: ICD-10-CM

## 2019-11-06 DIAGNOSIS — K08.9: Primary | ICD-10-CM

## 2019-11-06 PROCEDURE — 99282 EMERGENCY DEPT VISIT SF MDM: CPT

## 2019-11-07 VITALS — SYSTOLIC BLOOD PRESSURE: 136 MMHG | DIASTOLIC BLOOD PRESSURE: 76 MMHG

## 2019-11-07 NOTE — ER DOCUMENT REPORT
HPI





- HPI


Time Seen by Provider: 11/07/19 02:36


Pain Level: 5


Context: 


Patient is a 24-year-old female that comes to the emergency department for chief

complaint of pain in her left lower jaw, she states she has a known broken tooth

but she has not been able to schedule repair, for the past couple of days the 

pain has become constant and significantly worse.  She denies difficulty 

swallowing or breathing, neck pain, fever/chills.  She denies pregnancy.








- REPRODUCTIVE


Reproductive: DENIES: Pregnant:





Past Medical History





- General


Information source: Patient





- Social History


Smoking Status: Former Smoker


Frequency of alcohol use: None


Drug Abuse: None


Lives with: Family


Family History: Reviewed & Not Pertinent


Patient has suicidal ideation: No


Patient has homicidal ideation: No


Renal/ Medical History: Reports: Hx Ovarian Cysts.  Denies: Hx Peritoneal 

Dialysis





- Immunizations


Immunizations up to date: Yes


Hx Diphtheria, Pertussis, Tetanus Vaccination: Yes





Vertical Provider Document





- CONSTITUTIONAL


General Appearance: WD/WN, No Apparent Distress





- INFECTION CONTROL


TRAVEL OUTSIDE OF THE [place] IN LAST 30 DAYS: No





- HEENT


HEENT: Atraumatic, Normocephalic.  negative: Normal ENT Exam - No swelling of 

the face or submandibular area.  Normal oropharyngeal exam except for dental 

exam, see below, normal ears,, normal nasal exam, normal eye exam


Mouth Diagram: 


                            __________________________














                            __________________________





 1 - Dental caries with surrounding erythema and tenderness but no induration or

fluctuance, no drainable abscess, unremarkable otherwise








Course





- Re-evaluation


Re-evalutation: 


Exam is consistent with dental infection without secondary abscess or concerning

findings otherwise.  Discussed follow-up and importance of dental treatment.  

Discussed return precautions.  Patient states understanding and agreement.





- Vital Signs


Vital signs: 


                                        











Temp Pulse Resp BP Pulse Ox


 


 98.3 F   86   20   144/85 H  99 


 


 11/06/19 23:32  11/06/19 23:32  11/06/19 23:32  11/06/19 23:32  11/06/19 23:32














Discharge





- Discharge


Clinical Impression: 


 Pain, dental





Condition: Stable


Disposition: HOME, SELF-CARE


Additional Instructions: 


Your evaluation is consistent with a developing dental infection.  Take the 

antibiotics as prescribed to completion, call the listed referral to follow-up 

closely with the dentist or this will continue to happen.  Return if you worsen 

including swelling of the face or any other concerning or worsening symptoms.





______________________





[facility] [address], [address] [phone]


Prescriptions: 


Penicillin V Potassium [Penicillin Vk 500 mg Tablet] 500 mg PO BID #20 tablet


Forms:  Return to Work

## 2019-12-23 ENCOUNTER — HOSPITAL ENCOUNTER (EMERGENCY)
Dept: HOSPITAL 62 - ER | Age: 24
Discharge: HOME | End: 2019-12-23
Payer: SELF-PAY

## 2019-12-23 VITALS — SYSTOLIC BLOOD PRESSURE: 159 MMHG | DIASTOLIC BLOOD PRESSURE: 77 MMHG

## 2019-12-23 DIAGNOSIS — R19.7: ICD-10-CM

## 2019-12-23 DIAGNOSIS — R07.9: ICD-10-CM

## 2019-12-23 DIAGNOSIS — R09.89: ICD-10-CM

## 2019-12-23 DIAGNOSIS — J40: Primary | ICD-10-CM

## 2019-12-23 DIAGNOSIS — R51: ICD-10-CM

## 2019-12-23 DIAGNOSIS — J02.9: ICD-10-CM

## 2019-12-23 DIAGNOSIS — R50.9: ICD-10-CM

## 2019-12-23 DIAGNOSIS — Z87.891: ICD-10-CM

## 2019-12-23 DIAGNOSIS — R11.2: ICD-10-CM

## 2019-12-23 DIAGNOSIS — R09.81: ICD-10-CM

## 2019-12-23 LAB
A TYPE INFLUENZA AG: NEGATIVE
B INFLUENZA AG: NEGATIVE

## 2019-12-23 PROCEDURE — 99283 EMERGENCY DEPT VISIT LOW MDM: CPT

## 2019-12-23 PROCEDURE — 87804 INFLUENZA ASSAY W/OPTIC: CPT

## 2019-12-23 PROCEDURE — 87880 STREP A ASSAY W/OPTIC: CPT

## 2019-12-23 PROCEDURE — 71046 X-RAY EXAM CHEST 2 VIEWS: CPT

## 2019-12-23 PROCEDURE — 87077 CULTURE AEROBIC IDENTIFY: CPT

## 2019-12-23 PROCEDURE — 87070 CULTURE OTHR SPECIMN AEROBIC: CPT

## 2019-12-23 PROCEDURE — S0119 ONDANSETRON 4 MG: HCPCS

## 2019-12-23 PROCEDURE — 94640 AIRWAY INHALATION TREATMENT: CPT

## 2019-12-23 NOTE — ER DOCUMENT REPORT
HPI





- HPI


Time Seen by Provider: 12/23/19 12:20


Pain Level: Denies


Context: 





Patient reports that she has had a cough since November 1 of this year.  Patient

states that over the past 3 days she has felt hot and suspects that she may have

had a fever.  Patient also complains of nausea vomiting and diarrhea over the 

past few days.  Patient states she vomited 3 times today and had diarrhea one 

episode.  Patient does complain of headache as well.  Patient denies any ab

dominal tenderness.


Associated Symptoms: Chest pain - with cough, Nonproductive cough, Diarrhea, 

Fever, Nausea, Vomiting.  denies: Weakness


Exacerbated by: Denies


Relieved by: Denies


Similar symptoms previously: No


Recently seen / treated by doctor: No





- ROS


ROS below otherwise negative: Yes


Systems Reviewed and Negative: Yes All other systems reviewed and negative





- CONSTITUTIONAL


Constitutional: REPORTS: Fever





- EENT


EENT: REPORTS: Sore Throat, Nasal Drainage-Clear, Congestion.  DENIES: Ear Pain





- NEURO


Neurology: REPORTS: Headache.  DENIES: Dizzinesss / Vertigo





- RESPIRATORY


Respiratory: REPORTS: Trouble Breathing - when coughing, Coughing





- GASTROINTESTINAL


Gastrointestinal: REPORTS: Nausea, Patient vomiting, Diarrhea.  DENIES: 

Abdominal Pain, Black / Bloody Stools





- URINARY


Urinary: DENIES: Dysuria





- REPRODUCTIVE


Reproductive: DENIES: Pregnant:





- DERM


Skin Color: Normal


Skin Problems: None





Past Medical History





- General


Information source: Patient





- Social History


Smoking Status: Former Smoker


Chew tobacco use (# tins/day): No


Drug Abuse: None


Occupation: call center


Family History: Reviewed & Not Pertinent


Patient has suicidal ideation: No


Patient has homicidal ideation: No





- Medical History


Medical History: Negative


Renal/ Medical History: Reports: Hx Ovarian Cysts.  Denies: Hx Peritoneal 

Dialysis


Surgical Hx: Negative





- Immunizations


Immunizations up to date: Yes


Hx Diphtheria, Pertussis, Tetanus Vaccination: Yes





Vertical Provider Document





- CONSTITUTIONAL


Agree With Documented VS: Yes


Exam Limitations: No Limitations


General Appearance: WD/WN, No Apparent Distress





- INFECTION CONTROL


TRAVEL OUTSIDE OF THE [place] IN LAST 30 DAYS: No





- HEENT


HEENT: Atraumatic, Normocephalic, Pharyngeal Tenderness, Pharyngeal Erythema.  

negative: Pharyngeal Exudate, Tympanic Membrane Red, Tympanic Membrane Bulging


Notes: 





clear rhinorrhea





- NECK


Neck: Normal Inspection, Supple.  negative: Lymphadenopathy-Left, 

Lymphadenopathy-Right





- RESPIRATORY


Respiratory: No Respiratory Distress, Rhonchi.  negative: Chest Non-Tender - 

Anterior chest tenderness with coughing





- CARDIOVASCULAR


Cardiovascular: Regular Rate, Regular Rhythm, No Murmur.  negative: Tachycardia





- GI/ABDOMEN


Gastrointestinal: Abdomen Soft, Abdomen Non-Tender, No Organomegaly





- BACK


Back: Normal Inspection





- MUSCULOSKELETAL/EXTREMETIES


Musculoskeletal/Extremeties: MAJOHN, FROM





- NEURO


Level of Consciousness: Awake, Alert, Appropriate


Motor/Sensory: No Motor Deficit





- DERM


Integumentary: Warm, Dry, No Rash





Course





- Re-evaluation


Re-evalutation: 





12/23/19 13:11


Chest x-ray reviewed, no concern for pneumonia or pneumothorax.  Patient with 

stable vital signs.  No emesis while here.  Patient's rapid strep and influenza 

tests are negative at this time.  Patient reports cough for almost 2 months 

duration.  Patient encouraged to follow-up with a primary care provider for 

further evaluation.





- Vital Signs


Vital signs: 


                                        











Temp Pulse Resp BP Pulse Ox


 


 97.9 F   85   18   159/77 H  100 


 


 12/23/19 12:20  12/23/19 12:20  12/23/19 12:20  12/23/19 12:20  12/23/19 12:20














- Diagnostic Test


Radiology reviewed: Image reviewed, Reports reviewed





Discharge





- Discharge


Clinical Impression: 


 Bronchitis





Nausea & vomiting


Qualifiers:


 Vomiting type: unspecified Vomiting Intractability: unspecified Qualified 

Code(s): R11.2 - Nausea with vomiting, unspecified





Diarrhea


Qualifiers:


 Diarrhea type: unspecified type Qualified Code(s): R19.7 - Diarrhea, 

unspecified





Condition: Stable


Disposition: HOME, SELF-CARE


Additional Instructions: 


Return immediately for any new or worsening symptoms





Followup with your primary care provider, call tomorrow to make a followup 

appointment





VOMITING:





     Vomiting (or nausea without vomiting) can be caused by many other different

problems. It can mean that something's wrong with the stomach, such as ulcers or

inflammation or the intestinal tract, such as appendicitis.  But it can also be 

a symptom of a problem that has nothing to do with the stomach or intestines. 

Vomiting is common with severe headaches, earaches, tonsillitis, and kidney 

infections, etc. We see it with pneumonia or heart attacks. Drugs can cause 

nausea and vomiting. Many abdominal problems cause vomiting; for example, 

gallstones, kidney stones, pancreatitis, and intestinal obstruction (blocked 

bowels).


     In most cases, curing the vomiting depends on fixing the problem that 

caused it. For temporary relief, we may use an anti-nausea medicine. For home 

use, we can prescribe suppositories, chewable pills, pills that dissolve in the 

mouth, or liquid anti-nausea drugs. If the vomiting seems to be caused by a 

problem in the stomach, acid-suppressing drugs may be prescribed as well.


     It's important to avoid dehydration. Sip small amounts of clear liquids 

(soft drinks, tea, broth, etc) . Try to take fluids frequently even if you are 

vomiting to prevent dehydration.  Take increasing amounts of fluid and when 

liquids are being consumed successfully, advance to small amounts of bland food 

(toast, soups, mashed potatoes, etc.) until you are able to resume a regular 

diet.  Avoid aspirin, tobacco, and alcohol.


     If the vomiting worsens, if the problem that's making you vomit worsens, or

if there's evidence of bleeding in the stomach (such as black, tarry stool, or 

bloody or black vomit), you should return immediately. Also, return if abdominal

pain worsens or becomes localized to one area or you develop high fever.  Call 

your doctor if you aren't improved in 24 hours.








DIARRHEA, NON-SPECIFIC:





     Diarrhea means frequent, watery stools. There are many causes. Any problem 

that keeps the intestinal tract from absorbing water from the stool can lead to 

diarrhea. 


     A sudden new diarrhea problem is usually caused by a virus, food 

sensitivity, toxic bacteria, or drugs. In this case, we expect the problem to go

away soon. Testing is done only if you seem seriously ill from the diarrhea.


     If you have chronic diarrhea, or diarrhea that keeps coming back, we need 

to find out why. Chronic diarrhea can be due to inflammation of the bowels such 

as Crohn's disease or ulcerative colitis, food sensitivity such as intolerance 

to lactose or wheat protein, irritable bowel syndrome, and other problems. If 

your diarrhea is a significant problem but it's not clear why you have it, we'll

refer you to a specialist for further testing.


     During an episode of diarrhea, drink small amounts (two to six ounces) of 

clear liquids (soft drinks, sport drinks, herb teas, broth, etc).  Take fluids 

frequently to prevent dehydration. It's usually not a problem to take mild anti-

diarrhea medication such as Kaopectate or Pepto-Bismol. As the diarrhea eases, 

advance to small amounts of bland food (mashed potato, toast) for 24 hours.


     Call the physician if blood appears in your vomit or stool, if vomiting la

sts longer than 24 hours, if the abdominal pain worsens or becomes localized to 

one area, if you develop high fever, or if you become lightheaded and weak.








VIRAL SYNDROME:


     The physician has diagnosed a viral infection.  Viruses not only cause 

"colds," but can cause many different symptoms including generalized aching, 

fever, headache, cough, diarrhea, nausea, vomiting, and fatigue.


     The treatment, for the most part, is simply relief of symptoms. This means 

that antibiotics are usually not given.  Rest, fluids, pain medications and, 

occasionally, medication for the specific symptoms that are most bothersome will

be prescribed. Use good handwashing to avoid passing the virus to others. Shared

toys should be cleaned with disinfectant. Clean the toilets, sinks, and counter 

surfaces in bathrooms. Launder clothing in hot water.


     Contact the physician if you develop any new or unusual symptoms such as 

severe headache, stiff neck, high fever, chest pain, productive cough, or gopi

rtness of breath.  You should be rechecked if you don't see marked improvement 

within seven to 10 days.








[place] MEDICATION:


     You have been given a medication to suppress nausea and vomiting. This type

of medication can be given as a shot, pill, or suppository. It will usually last

for many hours.  Pills and shots usually last six to eight hours.  For the 

typical illness, only one or two doses of the medication may be necessary.


     Mild lightheadedness may occur.  This type of medicine can cause 

drowsiness.  Do not drive or operate dangerous machinery while under its 

influence.  Do not mix with alcohol.


     See your doctor at once if you have muscle spasms or tightness, or 

uncontrollable motions (particularly of the neck, mouth, or jaw). Persistent 

vomiting or severe lightheadedness should also be evaluated by the physician.





BRONCHITIS:





     You have acute bronchitis.  This disease is an infection or inflammation of

the air passageways in your lungs.  Symptoms usually include cough, low grade 

fever, shortness of breath, and wheezing.  The cough usually persists for a 

couple of weeks.


     Most cases of bronchitis get better without antibiotics.  We prescribe 

antibiotics when we believe bacteria are damaging your airways, or if there's 

high risk the bronchitis will worsen into pneumonia.


     Increase your fluid intake. A cool mist humidifier may make your lungs more

comfortable.  An expectorant (cough medicine that loosens phlegm) can help.  If 

you smoke, STOP!!!  Recovery from bronchitis can be somewhat slow, but you 

should see improvement within a day or two.


     Repeated episodes of bronchitis may result in lung damage -- for example, 

chronic bronchitis, recurrent pneumonias, or emphysema.


     Call the doctor if you develop increasing fever, shortness of breath, chest

pain, bloody sputum, or otherwise worsen.  If you have not improved at all after

several days, contact the physician.











USE OF ACETAMINOPHEN (Tylenol):


     Acetaminophen may be taken for pain relief or fever control. It's much 

safer than aspirin, offering a wider range of "safe" dosages.  It is safe during

pregnancy.  Some brand names are Tylenol, Panadol, Datril, Anacin 3, Tempra, and

Liquiprin. Acetaminophen can be repeated every four hours.  The following are 

maximum recommended dosages:


>89 pounds or adults          650 mg to 900 mg


Acetaminophen can be repeated every four hours.  Maximum dose not to exceed 4000

mg a day.








FOLLOW-UP CARE:


If you have been referred to a physician for follow-up care, call the 

physicians office for an appointment as you were instructed or within the next 

two days.  If you experience worsening or a significant change in your symptoms,

notify the physician immediately or return to the Emergency Department at any 

time for re-evaluation.





Prescriptions: 


Dextromethorphan HBr [Delsym] 10 ml PO Q12 PRN #100 ml


 PRN Reason: 


Ondansetron HCl [Zofran 4 mg Tablet] 1 - 2 tab PO Q6 PRN #15 tablet


 PRN Reason: 


Forms:  Return to Work


Referrals: 


[facility] [Provider Group] - Follow up as needed

## 2019-12-23 NOTE — RADIOLOGY REPORT (SQ)
EXAM DESCRIPTION:  CHEST 2 VIEWS



COMPLETED DATE/TIME:  12/23/2019 12:51 pm



REASON FOR STUDY:  cough



COMPARISON:  2/7/2019



EXAM PARAMETERS:  NUMBER OF VIEWS: two views

TECHNIQUE: Digital Frontal and Lateral radiographic views of the chest acquired.

RADIATION DOSE: NA

LIMITATIONS: none



FINDINGS:  LUNGS AND PLEURA: No opacities, masses or pneumothorax. No pleural effusion.

MEDIASTINUM AND HILAR STRUCTURES: No masses or contour abnormalities.

HEART AND VASCULAR STRUCTURES: Heart normal size.  No evidence for failure.

BONES: No acute findings.

HARDWARE: None in the chest.

OTHER: No other significant finding.



IMPRESSION:  NO ACUTE RADIOGRAPHIC FINDING IN THE CHEST.



TECHNICAL DOCUMENTATION:  JOB ID:  [id number]

 2011 [employer]- All Rights Reserved



Reading location - IP/workstation name: [email or web address]

## 2020-01-29 ENCOUNTER — HOSPITAL ENCOUNTER (EMERGENCY)
Dept: HOSPITAL 62 - ER | Age: 25
Discharge: HOME | End: 2020-01-29
Payer: SELF-PAY

## 2020-01-29 VITALS — DIASTOLIC BLOOD PRESSURE: 86 MMHG | SYSTOLIC BLOOD PRESSURE: 137 MMHG

## 2020-01-29 DIAGNOSIS — B34.9: Primary | ICD-10-CM

## 2020-01-29 DIAGNOSIS — R05: ICD-10-CM

## 2020-01-29 LAB
A TYPE INFLUENZA AG: NEGATIVE
APPEARANCE UR: (no result)
APTT PPP: YELLOW S
B INFLUENZA AG: NEGATIVE
BILIRUB UR QL STRIP: NEGATIVE
GLUCOSE UR STRIP-MCNC: NEGATIVE MG/DL
KETONES UR STRIP-MCNC: NEGATIVE MG/DL
PH UR STRIP: 6 [PH] (ref 5–9)
PROT UR STRIP-MCNC: 30 MG/DL
SP GR UR STRIP: 1.02
UROBILINOGEN UR-MCNC: NEGATIVE MG/DL (ref ?–2)

## 2020-01-29 PROCEDURE — 81025 URINE PREGNANCY TEST: CPT

## 2020-01-29 PROCEDURE — 99283 EMERGENCY DEPT VISIT LOW MDM: CPT

## 2020-01-29 PROCEDURE — 71046 X-RAY EXAM CHEST 2 VIEWS: CPT

## 2020-01-29 PROCEDURE — 81001 URINALYSIS AUTO W/SCOPE: CPT

## 2020-01-29 PROCEDURE — 87804 INFLUENZA ASSAY W/OPTIC: CPT

## 2020-01-29 NOTE — RADIOLOGY REPORT (SQ)
EXAM DESCRIPTION:  CHEST 2 VIEWS



COMPLETED DATE/TIME:  1/29/2020 1:50 pm



REASON FOR STUDY:  COUGH/CHILLS



COMPARISON:  12/23/2019



EXAM PARAMETERS:  NUMBER OF VIEWS: two views

TECHNIQUE: Digital Frontal and Lateral radiographic views of the chest acquired.

RADIATION DOSE: NA

LIMITATIONS: none



FINDINGS:  LUNGS AND PLEURA: No opacities, masses or pneumothorax. No pleural effusion.

MEDIASTINUM AND HILAR STRUCTURES: No masses or contour abnormalities.

HEART AND VASCULAR STRUCTURES: Heart normal size.  No evidence for failure.

BONES: No acute findings.

HARDWARE: None in the chest.

OTHER: No other significant finding.



IMPRESSION:  NO ACUTE RADIOGRAPHIC FINDING IN THE CHEST.



TECHNICAL DOCUMENTATION:  JOB ID:  [id number]

 2011 [employer]- All Rights Reserved



Reading location - IP/workstation name: [email or web address]

## 2020-01-29 NOTE — ER DOCUMENT REPORT
HPI





- HPI


Time Seen by Provider: 01/29/20 13:07


Pain Level: 3


Notes: 





24-year-old female patient presenting to the emergency department with complaint

of cough that is been ongoing for several months.  Patient reports it is a 

nonproductive cough, states it is gotten worse over the last few days.  She 

denies any nausea, vomiting, diarrhea or reports multiple sick contacts at work.








- REPRODUCTIVE


LMP: oct


Reproductive: DENIES: Pregnant:





Past Medical History





- General


Information source: Patient





- Social History


Smoking Status: Never Smoker


Chew tobacco use (# tins/day): No


Frequency of alcohol use: Occasional


Drug Abuse: None


Family History: Reviewed & Not Pertinent


Patient has suicidal ideation: No


Patient has homicidal ideation: No


Renal/ Medical History: Reports: Hx Ovarian Cysts.  Denies: Hx Peritoneal 

Dialysis


Surgical Hx: Negative





- Immunizations


Immunizations up to date: Yes


Hx Diphtheria, Pertussis, Tetanus Vaccination: Yes





Vertical Provider Document





- CONSTITUTIONAL


Notes: 





PHYSICAL EXAMINATION:





GENERAL: Well-appearing, well-nourished and in no acute distress.





HEAD: Atraumatic, normocephalic.





EYES: Pupils equal round and reactive to light, extraocular movements intact, 

conjunctiva are normal.





ENT: Nares patent, oropharynx clear without exudates.  Moist mucous membranes.





NECK: Normal range of motion, supple without lymphadenopathy





LUNGS: Breath sounds clear to auscultation bilaterally and equal.  No wheezes 

rales or rhonchi.





HEART: Regular rate and rhythm without murmurs





ABDOMEN: Soft, nontender, nondistended abdomen.  No guarding, no rebound.  No 

masses appreciated.





Female : deferred





Musculoskeletal: Normal range of motion, no pitting or edema.  No cyanosis.





NEUROLOGICAL: Cranial nerves grossly intact.  Normal speech, normal gait.  

Normal sensory, motor exams





PSYCH: Normal mood, normal affect.





SKIN: Warm, Dry, normal turgor, no rashes or lesions noted.





- INFECTION CONTROL


TRAVEL OUTSIDE OF THE [place] IN LAST 30 DAYS: No





Course





- Re-evaluation


Re-evalutation: 





Patient appears well, nontoxic, vital signs within normal limits.  Chest x-ray 

was negative.  Influenza negative.  Likely viral illness.  Patient will be 

treated conservatively.  Test results were discussed with patient, patient 

verbalized understanding and agreement with plan.








- Vital Signs


Vital signs: 


                                        











Temp Pulse Resp BP Pulse Ox


 


 98 F   73   18   177/79 H  100 


 


 01/29/20 12:25  01/29/20 12:25  01/29/20 12:25  01/29/20 12:25  01/29/20 12:25














Discharge





- Discharge


Clinical Impression: 


 Viral syndrome, Cough





Condition: Stable


Disposition: HOME, SELF-CARE


Instructions:  Viral Syndrome ([facility])


Additional Instructions: 


Chest x-ray does not show any acute findings to include pneumonia or bronchitis.

 It is possible that you have a viral syndrome.  Please take all medications as 

prescribed.  Drink plenty of fluids.  Follow-up with your primary care provider 

if not improving in the next 3 to 5 days.


Prescriptions: 


Benzonatate [Tessalon Perles 100 mg Capsule] 1 - 2 tab PO Q8HP PRN #30 capsule


 PRN Reason: 


RX: Prednisone [Deltasone 20 mg Tablet] 3 tab PO DAILY 5 Days #15 tablet


Forms:  Return to Work